# Patient Record
Sex: FEMALE | Race: WHITE | Employment: PART TIME | URBAN - METROPOLITAN AREA
[De-identification: names, ages, dates, MRNs, and addresses within clinical notes are randomized per-mention and may not be internally consistent; named-entity substitution may affect disease eponyms.]

---

## 2019-02-26 DIAGNOSIS — Z00.00 PREVENTATIVE HEALTH CARE: ICD-10-CM

## 2019-03-01 DIAGNOSIS — Z00.00 PREVENTATIVE HEALTH CARE: ICD-10-CM

## 2022-07-13 ENCOUNTER — OFFICE VISIT (OUTPATIENT)
Dept: ORTHOPEDIC SURGERY | Age: 57
End: 2022-07-13
Payer: COMMERCIAL

## 2022-07-13 VITALS — WEIGHT: 150 LBS | BODY MASS INDEX: 24.99 KG/M2 | HEIGHT: 65 IN

## 2022-07-13 DIAGNOSIS — M17.11 PRIMARY OSTEOARTHRITIS OF RIGHT KNEE: Primary | ICD-10-CM

## 2022-07-13 PROCEDURE — 99203 OFFICE O/P NEW LOW 30 MIN: CPT | Performed by: ORTHOPAEDIC SURGERY

## 2022-07-13 RX ORDER — DULOXETIN HYDROCHLORIDE 60 MG/1
CAPSULE, DELAYED RELEASE ORAL
COMMUNITY
Start: 2022-06-02

## 2022-07-13 RX ORDER — DICLOFENAC SODIUM 75 MG/1
75 TABLET, DELAYED RELEASE ORAL 2 TIMES DAILY
Qty: 60 TABLET | Refills: 1 | Status: SHIPPED | OUTPATIENT
Start: 2022-07-13

## 2022-07-13 RX ORDER — GLUC/MSM/COLGN2/HYAL/ANTIARTH3 375-375-20
1 TABLET ORAL 3 TIMES DAILY
COMMUNITY

## 2022-07-13 NOTE — LETTER
7/13/2022    Patient: Jodie Abarca   YOB: 1965   Date of Visit: 7/13/2022     Susan Zee MD  [address] [place] [id number]  Via Fax: [phone]    Dear Susan Zee MD,      Thank you for referring Ms. Kareem Concepcion to [facility] for evaluation. My notes for this consultation are attached. If you have questions, please do not hesitate to call me. I look forward to following your patient along with you.       Sincerely,    Sergey Call MD

## 2022-07-14 NOTE — PROGRESS NOTES
Vickie Lisa (: 1965) is a 56 y.o. female, patient, here for evaluation of the following chief complaint(s):  Knee Pain (right )       SUBJECTIVE/OBJECTIVE:  Vickie Lisa presents today complaining of right knee pain. She is a very active person, teaches fitness classes. Relatively recently began having some right medial knee pain, with symptoms that progressed and involved to include intermittent posterior mechanical type pain, sharp and quick. Relates subjective stiffness and tightness, no longer having medial pain. Does not have wakening night pain. She is still able to do squats as part of her exercise regimen, but cannot go was deep. Relates stiffness and tightness going downstairs. She is taking no medications for current symptoms. PHYSICAL EXAM:  Vitals: Ht 5' 5\" (1.651 m)   Wt 150 lb (68 kg)   BMI 24.96 kg/m²   Body mass index is 24.96 kg/m². 56y.o. year old F, no distress. Ambulates without a limp. Pain-free motion right hip. Negative Stinchfield. Right knee neutral alignment. Mild effusion but no warmth. Mild peripatellar tenderness. No joint line tenderness. No popliteal tenderness, no popliteal masses. Full active extension with flexion to approximately 135/140 degrees. Tightness with deep flexion. Patella tracks centrally. No instability. Negative Blanca's. Symmetrical palpable distal pulses. No gross motor or sensory deficits in lower extremities. No distal edema. IMAGING:  Radiographs: XR Results (most recent):  Results from Appointment encounter on 22    XR KNEE RT MIN 4 V    Narrative  4 x-ray views of the right knee including AP and PA flexion, lateral, sunrise demonstrate moderate patellofemoral osteoarthritis also of patellofemoral space laterally. Preserved tibiofemoral joint spaces, but there are marginal osteophytes present in the lateral compartment. ASSESSMENT/PLAN:  1.  Primary osteoarthritis of right knee  - XR KNEE RT MIN 4 V; Future  -     diclofenac EC (VOLTAREN) 75 mg EC tablet; Take 1 Tablet by mouth two (2) times a day., Normal, Disp-60 Tablet, R-1    The xray and exam findings were discussed with the patient today. Moderate patellofemoral osteoarthritis of the right knee. We will try a short course of prescription nonsteroidal anti-inflammatories. If symptoms or not improving, may consider corticosteroid injection, with aspiration if needed at that time. Discussed natural history of disease process. She will return as needed. No follow-ups on file. Review Of Systems  ROS     Positive for: Musculoskeletal    Last edited by Lidia Rubio on 7/13/2022 12:28 PM. (History)         Patient denies any recent fever, chills, nausea, vomiting, chest pain, or shortness of breath. Allergies   Allergen Reactions    Percocet [Oxycodone-Acetaminophen] Other (comments)     dizzy       Current Outpatient Medications   Medication Sig    glucosamine sulfate dipotassium-chondroitin sulfate 500-400 mg tablet Take 1 Tablet by mouth three (3) times daily.  MAGNESIUM PO Take 1 Tablet by mouth daily.  DULoxetine (CYMBALTA) 60 mg capsule TAKE 1 CAPSULE BY MOUTH EVERY MORNING IF NOT SEEN WITHIN THE PAST 6 WEEKS, WILL BE UNABLE TO REFILL.  LAMOTRIGINE PO Take  by mouth.  diclofenac EC (VOLTAREN) 75 mg EC tablet Take 1 Tablet by mouth two (2) times a day. No current facility-administered medications for this visit. History reviewed. No pertinent past medical history.      Past Surgical History:   Procedure Laterality Date    HX APPENDECTOMY  age17    HX HEENT  age16    tonsillectomy       Family History   Problem Relation Age of Onset    Cancer Father         skin ca    Coronary Art Dis Maternal Grandfather         age75    Arthritis-rheumatoid Maternal Grandmother         Social History     Socioeconomic History    Marital status:      Spouse name: Not on file    Number of children: Not on file    Years of education: Not on file    Highest education level: Not on file   Occupational History    Not on file   Tobacco Use    Smoking status: Former Smoker     Quit date: 1995     Years since quittin.5    Smokeless tobacco: Never Used   Vaping Use    Vaping Use: Never used   Substance and Sexual Activity    Alcohol use: Yes     Alcohol/week: 1.7 standard drinks     Types: 2 Standard drinks or equivalent per week    Drug use: No    Sexual activity: Yes   Other Topics Concern    Not on file   Social History Narrative    Not on file     Social Determinants of Health     Financial Resource Strain:     Difficulty of Paying Living Expenses: Not on file   Food Insecurity:     Worried About Running Out of Food in the Last Year: Not on file    Adrian of Food in the Last Year: Not on file   Transportation Needs:     Lack of Transportation (Medical): Not on file    Lack of Transportation (Non-Medical):  Not on file   Physical Activity:     Days of Exercise per Week: Not on file    Minutes of Exercise per Session: Not on file   Stress:     Feeling of Stress : Not on file   Social Connections:     Frequency of Communication with Friends and Family: Not on file    Frequency of Social Gatherings with Friends and Family: Not on file    Attends Druze Services: Not on file    Active Member of [address] [place] [address] or Organizations: Not on file    Attends Club or Organization Meetings: Not on file    Marital Status: Not on file   Intimate Partner Violence:     Fear of Current or Ex-Partner: Not on file    Emotionally Abused: Not on file    Physically Abused: Not on file    Sexually Abused: Not on file   Housing Stability:     Unable to Pay for Housing in the Last Year: Not on file    Number of [place] in the Last Year: Not on file    Unstable Housing in the Last Year: Not on file       Orders Placed This Encounter    XR KNEE RT MIN 4 V     Standing Status:   Future Number of Occurrences:   1     Standing Expiration Date:   7/14/2023    diclofenac EC (VOLTAREN) 75 mg EC tablet     Sig: Take 1 Tablet by mouth two (2) times a day. Dispense:  60 Tablet     Refill:  1        An electronic signature was used to authenticate this note.   -- López Doan MD

## 2025-06-11 ENCOUNTER — TRANSCRIBE ORDERS (OUTPATIENT)
Facility: HOSPITAL | Age: 60
End: 2025-06-11

## 2025-06-11 DIAGNOSIS — R06.02 SHORTNESS OF BREATH: Primary | ICD-10-CM
